# Patient Record
Sex: MALE | Race: WHITE | NOT HISPANIC OR LATINO | Employment: FULL TIME | URBAN - METROPOLITAN AREA
[De-identification: names, ages, dates, MRNs, and addresses within clinical notes are randomized per-mention and may not be internally consistent; named-entity substitution may affect disease eponyms.]

---

## 2023-11-20 LAB
ALT SERPL-CCNC: 34 IU/L (ref 10–50)
AST SERPL-CCNC: 22 IU/L (ref 10–50)
CREATININE (EXTERNAL): 1.13 MG/DL (ref 0.7–1.2)
GFR ESTIMATED (EXTERNAL): 73 ML/MIN/1.73M2
GLUCOSE (EXTERNAL): 123 MG/DL (ref 70–99)
POTASSIUM (EXTERNAL): 3.5 MMOL/L (ref 3.5–5.1)

## 2023-11-22 ENCOUNTER — HOSPITAL ENCOUNTER (INPATIENT)
Facility: CLINIC | Age: 63
LOS: 1 days | Discharge: HOME | End: 2023-11-23
Attending: EMERGENCY MEDICINE | Admitting: HOSPITALIST
Payer: COMMERCIAL

## 2023-11-22 ENCOUNTER — APPOINTMENT (OUTPATIENT)
Dept: ULTRASOUND IMAGING | Facility: CLINIC | Age: 63
End: 2023-11-22
Attending: EMERGENCY MEDICINE
Payer: COMMERCIAL

## 2023-11-22 ENCOUNTER — TRANSFERRED RECORDS (OUTPATIENT)
Dept: HEALTH INFORMATION MANAGEMENT | Facility: CLINIC | Age: 63
End: 2023-11-22

## 2023-11-22 DIAGNOSIS — D69.3 IDIOPATHIC THROMBOCYTOPENIC PURPURA (H): Primary | ICD-10-CM

## 2023-11-22 DIAGNOSIS — D69.6 THROMBOCYTOPENIA (H): ICD-10-CM

## 2023-11-22 PROBLEM — K76.0 FATTY LIVER: Status: ACTIVE | Noted: 2023-11-22

## 2023-11-22 LAB
ABO/RH(D): NORMAL
ALBUMIN SERPL BCG-MCNC: 4.1 G/DL (ref 3.5–5.2)
ALP SERPL-CCNC: 49 U/L (ref 40–150)
ALT SERPL W P-5'-P-CCNC: 30 U/L (ref 0–70)
ANION GAP SERPL CALCULATED.3IONS-SCNC: 10 MMOL/L (ref 7–15)
ANTIBODY SCREEN: NEGATIVE
APTT PPP: 28 SECONDS (ref 22–38)
AST SERPL W P-5'-P-CCNC: 20 U/L (ref 0–45)
BASOPHILS # BLD AUTO: 0 10E3/UL (ref 0–0.2)
BASOPHILS # BLD AUTO: 0 10E3/UL (ref 0–0.2)
BASOPHILS NFR BLD AUTO: 0 %
BASOPHILS NFR BLD AUTO: 0 %
BILIRUB SERPL-MCNC: 0.4 MG/DL
BLD PROD TYP BPU: NORMAL
BLOOD COMPONENT TYPE: NORMAL
BUN SERPL-MCNC: 23.1 MG/DL (ref 8–23)
CALCIUM SERPL-MCNC: 9 MG/DL (ref 8.8–10.2)
CHLORIDE SERPL-SCNC: 99 MMOL/L (ref 98–107)
CODING SYSTEM: NORMAL
CREAT SERPL-MCNC: 1.15 MG/DL (ref 0.67–1.17)
DEPRECATED HCO3 PLAS-SCNC: 29 MMOL/L (ref 22–29)
EGFRCR SERPLBLD CKD-EPI 2021: 72 ML/MIN/1.73M2
EOSINOPHIL # BLD AUTO: 0 10E3/UL (ref 0–0.7)
EOSINOPHIL # BLD AUTO: 0 10E3/UL (ref 0–0.7)
EOSINOPHIL NFR BLD AUTO: 0 %
EOSINOPHIL NFR BLD AUTO: 0 %
ERYTHROCYTE [DISTWIDTH] IN BLOOD BY AUTOMATED COUNT: 12.6 % (ref 10–15)
ERYTHROCYTE [DISTWIDTH] IN BLOOD BY AUTOMATED COUNT: 12.7 % (ref 10–15)
GLUCOSE SERPL-MCNC: 147 MG/DL (ref 70–99)
HCT VFR BLD AUTO: 40.8 % (ref 40–53)
HCT VFR BLD AUTO: 46.5 % (ref 40–53)
HGB BLD-MCNC: 13.9 G/DL (ref 13.3–17.7)
HGB BLD-MCNC: 15.5 G/DL (ref 13.3–17.7)
IMM GRANULOCYTES # BLD: 0 10E3/UL
IMM GRANULOCYTES # BLD: 0.1 10E3/UL
IMM GRANULOCYTES NFR BLD: 1 %
IMM GRANULOCYTES NFR BLD: 1 %
INR PPP: 0.91 (ref 0.85–1.15)
ISSUE DATE AND TIME: NORMAL
LYMPHOCYTES # BLD AUTO: 0.2 10E3/UL (ref 0.8–5.3)
LYMPHOCYTES # BLD AUTO: 0.4 10E3/UL (ref 0.8–5.3)
LYMPHOCYTES NFR BLD AUTO: 4 %
LYMPHOCYTES NFR BLD AUTO: 6 %
MCH RBC QN AUTO: 30 PG (ref 26.5–33)
MCH RBC QN AUTO: 30 PG (ref 26.5–33)
MCHC RBC AUTO-ENTMCNC: 33.3 G/DL (ref 31.5–36.5)
MCHC RBC AUTO-ENTMCNC: 34.1 G/DL (ref 31.5–36.5)
MCV RBC AUTO: 88 FL (ref 78–100)
MCV RBC AUTO: 90 FL (ref 78–100)
MONOCYTES # BLD AUTO: 0.1 10E3/UL (ref 0–1.3)
MONOCYTES # BLD AUTO: 0.1 10E3/UL (ref 0–1.3)
MONOCYTES NFR BLD AUTO: 1 %
MONOCYTES NFR BLD AUTO: 2 %
NEUTROPHILS # BLD AUTO: 5.7 10E3/UL (ref 1.6–8.3)
NEUTROPHILS # BLD AUTO: 6.1 10E3/UL (ref 1.6–8.3)
NEUTROPHILS NFR BLD AUTO: 91 %
NEUTROPHILS NFR BLD AUTO: 94 %
NRBC # BLD AUTO: 0 10E3/UL
NRBC # BLD AUTO: 0 10E3/UL
NRBC BLD AUTO-RTO: 0 /100
NRBC BLD AUTO-RTO: 0 /100
PLATELET # BLD AUTO: 39 10E3/UL (ref 150–450)
PLATELET # BLD AUTO: 4 10E3/UL (ref 150–450)
POTASSIUM SERPL-SCNC: 3.8 MMOL/L (ref 3.4–5.3)
PROT SERPL-MCNC: 7.7 G/DL (ref 6.4–8.3)
RBC # BLD AUTO: 4.64 10E6/UL (ref 4.4–5.9)
RBC # BLD AUTO: 5.17 10E6/UL (ref 4.4–5.9)
RETICS # AUTO: 0.05 10E6/UL (ref 0.03–0.1)
RETICS/RBC NFR AUTO: 1.1 % (ref 0.5–2)
SODIUM SERPL-SCNC: 138 MMOL/L (ref 135–145)
SPECIMEN EXPIRATION DATE: NORMAL
UNIT ABO/RH: NORMAL
UNIT NUMBER: NORMAL
UNIT STATUS: NORMAL
UNIT TYPE ISBT: 9500
WBC # BLD AUTO: 6.3 10E3/UL (ref 4–11)
WBC # BLD AUTO: 6.4 10E3/UL (ref 4–11)

## 2023-11-22 PROCEDURE — P9035 PLATELET PHERES LEUKOREDUCED: HCPCS | Performed by: EMERGENCY MEDICINE

## 2023-11-22 PROCEDURE — 86901 BLOOD TYPING SEROLOGIC RH(D): CPT | Performed by: EMERGENCY MEDICINE

## 2023-11-22 PROCEDURE — 76705 ECHO EXAM OF ABDOMEN: CPT

## 2023-11-22 PROCEDURE — 3E0330M INTRODUCTION OF MONOCLONAL ANTIBODY INTO PERIPHERAL VEIN, PERCUTANEOUS APPROACH: ICD-10-PCS | Performed by: STUDENT IN AN ORGANIZED HEALTH CARE EDUCATION/TRAINING PROGRAM

## 2023-11-22 PROCEDURE — 80053 COMPREHEN METABOLIC PANEL: CPT | Performed by: EMERGENCY MEDICINE

## 2023-11-22 PROCEDURE — 36415 COLL VENOUS BLD VENIPUNCTURE: CPT | Performed by: INTERNAL MEDICINE

## 2023-11-22 PROCEDURE — 96374 THER/PROPH/DIAG INJ IV PUSH: CPT

## 2023-11-22 PROCEDURE — 250N000013 HC RX MED GY IP 250 OP 250 PS 637: Performed by: EMERGENCY MEDICINE

## 2023-11-22 PROCEDURE — 250N000013 HC RX MED GY IP 250 OP 250 PS 637: Performed by: PHYSICIAN ASSISTANT

## 2023-11-22 PROCEDURE — 85730 THROMBOPLASTIN TIME PARTIAL: CPT | Performed by: EMERGENCY MEDICINE

## 2023-11-22 PROCEDURE — 85025 COMPLETE CBC W/AUTO DIFF WBC: CPT | Performed by: INTERNAL MEDICINE

## 2023-11-22 PROCEDURE — 36415 COLL VENOUS BLD VENIPUNCTURE: CPT | Performed by: EMERGENCY MEDICINE

## 2023-11-22 PROCEDURE — 85610 PROTHROMBIN TIME: CPT | Performed by: EMERGENCY MEDICINE

## 2023-11-22 PROCEDURE — 30243S1 TRANSFUSION OF NONAUTOLOGOUS GLOBULIN INTO CENTRAL VEIN, PERCUTANEOUS APPROACH: ICD-10-PCS | Performed by: EMERGENCY MEDICINE

## 2023-11-22 PROCEDURE — 250N000011 HC RX IP 250 OP 636: Mod: JZ | Performed by: EMERGENCY MEDICINE

## 2023-11-22 PROCEDURE — 258N000003 HC RX IP 258 OP 636: Performed by: EMERGENCY MEDICINE

## 2023-11-22 PROCEDURE — 120N000001 HC R&B MED SURG/OB

## 2023-11-22 PROCEDURE — 99223 1ST HOSP IP/OBS HIGH 75: CPT | Performed by: PHYSICIAN ASSISTANT

## 2023-11-22 PROCEDURE — 99285 EMERGENCY DEPT VISIT HI MDM: CPT | Mod: 25

## 2023-11-22 PROCEDURE — 85060 BLOOD SMEAR INTERPRETATION: CPT | Performed by: PATHOLOGY

## 2023-11-22 PROCEDURE — 85045 AUTOMATED RETICULOCYTE COUNT: CPT | Performed by: INTERNAL MEDICINE

## 2023-11-22 PROCEDURE — 85025 COMPLETE CBC W/AUTO DIFF WBC: CPT | Performed by: EMERGENCY MEDICINE

## 2023-11-22 RX ORDER — AMOXICILLIN 250 MG
1 CAPSULE ORAL 2 TIMES DAILY PRN
Status: DISCONTINUED | OUTPATIENT
Start: 2023-11-22 | End: 2023-11-23 | Stop reason: HOSPADM

## 2023-11-22 RX ORDER — AMLODIPINE BESYLATE 5 MG/1
5 TABLET ORAL 2 TIMES DAILY PRN
Status: DISCONTINUED | OUTPATIENT
Start: 2023-11-23 | End: 2023-11-23 | Stop reason: HOSPADM

## 2023-11-22 RX ORDER — OXYMETAZOLINE HYDROCHLORIDE 0.05 G/100ML
2 SPRAY NASAL 2 TIMES DAILY PRN
Status: DISCONTINUED | OUTPATIENT
Start: 2023-11-22 | End: 2023-11-23 | Stop reason: HOSPADM

## 2023-11-22 RX ORDER — DIPHENHYDRAMINE HCL 25 MG
50 CAPSULE ORAL
Status: DISCONTINUED | OUTPATIENT
Start: 2023-11-22 | End: 2023-11-23 | Stop reason: HOSPADM

## 2023-11-22 RX ORDER — AMOXICILLIN 250 MG
2 CAPSULE ORAL 2 TIMES DAILY PRN
Status: DISCONTINUED | OUTPATIENT
Start: 2023-11-22 | End: 2023-11-23 | Stop reason: HOSPADM

## 2023-11-22 RX ORDER — DIPHENHYDRAMINE HYDROCHLORIDE 50 MG/ML
50 INJECTION INTRAMUSCULAR; INTRAVENOUS ONCE
Status: COMPLETED | OUTPATIENT
Start: 2023-11-22 | End: 2023-11-22

## 2023-11-22 RX ORDER — PANTOPRAZOLE SODIUM 40 MG/1
40 TABLET, DELAYED RELEASE ORAL DAILY
Status: DISCONTINUED | OUTPATIENT
Start: 2023-11-23 | End: 2023-11-23 | Stop reason: HOSPADM

## 2023-11-22 RX ORDER — DIPHENHYDRAMINE HYDROCHLORIDE 50 MG/ML
50 INJECTION INTRAMUSCULAR; INTRAVENOUS
Status: COMPLETED | OUTPATIENT
Start: 2023-11-22 | End: 2023-11-23

## 2023-11-22 RX ORDER — ACETAMINOPHEN 325 MG/1
650 TABLET ORAL
Status: DISCONTINUED | OUTPATIENT
Start: 2023-11-22 | End: 2023-11-23 | Stop reason: HOSPADM

## 2023-11-22 RX ORDER — INDOMETHACIN 50 MG/1
50 CAPSULE ORAL 3 TIMES DAILY PRN
Status: ON HOLD | COMMUNITY
End: 2023-11-23

## 2023-11-22 RX ORDER — CALCIUM CARBONATE 500 MG/1
1000 TABLET, CHEWABLE ORAL 4 TIMES DAILY PRN
Status: DISCONTINUED | OUTPATIENT
Start: 2023-11-22 | End: 2023-11-23 | Stop reason: HOSPADM

## 2023-11-22 RX ORDER — PANTOPRAZOLE SODIUM 20 MG/1
20 TABLET, DELAYED RELEASE ORAL ONCE
Status: DISCONTINUED | OUTPATIENT
Start: 2023-11-22 | End: 2023-11-22

## 2023-11-22 RX ORDER — PREDNISONE 20 MG/1
100 TABLET ORAL DAILY
COMMUNITY

## 2023-11-22 RX ORDER — DIPHENHYDRAMINE HCL 25 MG
50 CAPSULE ORAL ONCE
Status: COMPLETED | OUTPATIENT
Start: 2023-11-22 | End: 2023-11-22

## 2023-11-22 RX ORDER — LIDOCAINE 40 MG/G
CREAM TOPICAL
Status: DISCONTINUED | OUTPATIENT
Start: 2023-11-22 | End: 2023-11-23 | Stop reason: HOSPADM

## 2023-11-22 RX ORDER — ACETAMINOPHEN 650 MG/1
650 SUPPOSITORY RECTAL EVERY 4 HOURS PRN
Status: DISCONTINUED | OUTPATIENT
Start: 2023-11-22 | End: 2023-11-23 | Stop reason: HOSPADM

## 2023-11-22 RX ORDER — ONDANSETRON 2 MG/ML
4 INJECTION INTRAMUSCULAR; INTRAVENOUS EVERY 6 HOURS PRN
Status: DISCONTINUED | OUTPATIENT
Start: 2023-11-22 | End: 2023-11-23 | Stop reason: HOSPADM

## 2023-11-22 RX ORDER — PANTOPRAZOLE SODIUM 40 MG/1
40 TABLET, DELAYED RELEASE ORAL
Status: DISCONTINUED | OUTPATIENT
Start: 2023-11-23 | End: 2023-11-22

## 2023-11-22 RX ORDER — ACETAMINOPHEN 325 MG/1
650 TABLET ORAL ONCE
Status: COMPLETED | OUTPATIENT
Start: 2023-11-22 | End: 2023-11-22

## 2023-11-22 RX ORDER — ONDANSETRON 4 MG/1
4 TABLET, ORALLY DISINTEGRATING ORAL EVERY 6 HOURS PRN
Status: DISCONTINUED | OUTPATIENT
Start: 2023-11-22 | End: 2023-11-23 | Stop reason: HOSPADM

## 2023-11-22 RX ORDER — ACETAMINOPHEN 325 MG/1
650 TABLET ORAL EVERY 4 HOURS PRN
Status: DISCONTINUED | OUTPATIENT
Start: 2023-11-22 | End: 2023-11-23 | Stop reason: HOSPADM

## 2023-11-22 RX ORDER — METHYLPREDNISOLONE SODIUM SUCCINATE 125 MG/2ML
125 INJECTION, POWDER, LYOPHILIZED, FOR SOLUTION INTRAMUSCULAR; INTRAVENOUS
Status: DISCONTINUED | OUTPATIENT
Start: 2023-11-22 | End: 2023-11-23 | Stop reason: HOSPADM

## 2023-11-22 RX ORDER — DIPHENHYDRAMINE HYDROCHLORIDE 50 MG/ML
50 INJECTION INTRAMUSCULAR; INTRAVENOUS
Status: DISCONTINUED | OUTPATIENT
Start: 2023-11-22 | End: 2023-11-23 | Stop reason: HOSPADM

## 2023-11-22 RX ADMIN — Medication 1 SPRAY: at 18:30

## 2023-11-22 RX ADMIN — ACETAMINOPHEN 650 MG: 325 TABLET, FILM COATED ORAL at 14:40

## 2023-11-22 RX ADMIN — Medication 1 SPRAY: at 20:27

## 2023-11-22 RX ADMIN — SODIUM CHLORIDE 1000 MG: 9 INJECTION, SOLUTION INTRAVENOUS at 13:23

## 2023-11-22 RX ADMIN — HUMAN IMMUNOGLOBULIN G 35 G: 20 LIQUID INTRAVENOUS at 15:15

## 2023-11-22 RX ADMIN — DIPHENHYDRAMINE HYDROCHLORIDE 50 MG: 25 CAPSULE ORAL at 14:40

## 2023-11-22 ASSESSMENT — ACTIVITIES OF DAILY LIVING (ADL)
ADLS_ACUITY_SCORE: 18
ADLS_ACUITY_SCORE: 18
ADLS_ACUITY_SCORE: 35
ADLS_ACUITY_SCORE: 18
ADLS_ACUITY_SCORE: 35
ADLS_ACUITY_SCORE: 35

## 2023-11-22 NOTE — H&P
[facility]  History and Physical - Hospitalist Service     Date of Admission:  11/22/2023  PRIMARY CARE PROVIDER: No Ref-Primary, Physician    Assessment & Plan   Link Campbell is a 63 year old male , with a PMH significant for chronic ITP, recently started treatment, fatty liver, gout,, who was sent from oncology office for platelets of 4 and admitted on 11/22/2023.,  Acute on chronic thrombocytopenia with need for additional plts/IVIG/steroids.    Principal Problem:    Idiopathic thrombocytopenic purpura (H)  Active Problems:    Thrombocytopenia (H24)    Fatty liver      Acute on Chroic Thrombocytopenia (H24)  Idiopathic thrombocytopenic purpura   Known ITP since 2015 per chart review.  In the last 3 weeks started IVIG, Solu-Medrol, and rituximab 2 days ago.  11/20 Platelets 15 (per pt) have fallen to 4, as proximal reason for admission from oncology office for platelet transfusion, steroids IVIG, additional work-up.  US abdomen w/o splenomegaly.  Transfuse 1 unit per oncology. Had minimal nosebleed (pinkish), o/w no other acute bleeding sx  -Admit inpatient  -Transfuse 1 unit now, prn plts<25 w bleed, >10 w/o  -[facility] consulted  - s/p IVIG in ED  - s/p Methylpred in ED  - add prn trazodone for sleep.,  - PTA PPI for stomach protection  -Ultrasound abdomen-spleen normal size  -Future bone biopsy per Onc  - afrin nasal prn recurrent mild nose bleed.  - add nasal saline q 2 hrs  - A.m. CBC, BMP  - avoid chemo ppx for DVT    Mild epistaxis, resolved  Pinkish bleed before, Likely secondary to thrombocytopenia.  However if recurs give Afrin as first as needed to abort, direct pressure.  - start nasal saline spray q 2 hrs while awake  - humidifier in room may help  - afrin prn recurrent mild bleed    Elevated BP w/o hx HTN  Likely steroids related  - add prn amlodipine for SBP>160      Fatty liver  Hx Alcohol use  Per patient/wife report about 2 olman per night.  Could contribute  "to low plts and hx fatty liver.  Advised to stop all alcohol , cSonaw past advised  -Alcohol cessation encouraged, he  -Follow-up with PCP    Hx Gout - PTA prn indomethacin held, no current sx    Clinically Significant Risk Factors Present on Admission                # Thrombocytopenia: Lowest platelets = 4 in last 2 days, will monitor for bleeding        # Obesity: Estimated body mass index is 31.93 kg/m  as calculated from the following:    Height as of this encounter: 1.727 m (5' 8\").    Weight as of this encounter: 95.3 kg (210 lb).                 Diet:  reg  DVT Prophylaxis: Low Risk/Ambulatory with no VTE prophylaxis indicated  Hayden Catheter: Not present  Lines: None     Cardiac Monitoring: None  Code Status:  FULL     Disposition: inpt, > 2 midnights expected   Disposition Plan      Expected Discharge Date: 11/24/2023             The patient has been discussed in detail with Dr. Segovia, hospitalist, who agrees with the assessment and plan as noted  The patient's care was discussed with the Attending Physician, Dr. Conner in ED, Patient, Patient's Family, Primary team, and   .  Entered: Prashant Ha PA-C 11/22/2023, 2:34 PM       Prashant Ha PA-C  [facility]  Securely message with the Vocera Web Console (learn more here)  Text page via Hello Agent Paging/Directory      The above form was partially completed using Dragon voice dictation software.  At times inadvertent word substitutions or word omissions may occur, not seen on proof read. Should any part of the documentation be unclear or otherwise contradictory, reader should please contact me for clarification.    ______________________________________________________________________    Chief Complaint     Low plts    History of Present Illness   History is obtained from the ED provider, patient and EMR.  Pt is considered a reliable historian      Patient essentially asx, he reports he has had minimal mild pinkish nosebleed.  " "Nothing javier.  Has not used any meds.  Has no other acute bleeding symptoms. No CP, RAMIREZ, SOB, Abd pain, other sx.     [place] [facility] notes reviewed  Chronic ITP since 2015.    Recent treatments below/summarized  -11/6-1 g Solu-Medrol  -11/8-1 g Solu-Medrol  -11/8 IVIG infusion  -11/9 IVIG  -11/10-started prednisone 100 daily  -11/20-first dose rituximab, pt report plts 15  -11/22-platelets 4, sent to admission.    No hx HTN, past meds  No steroids SE, sleeping ok, no mood sx  No other new sx    Past Medical History    I have reviewed this patient's medical history and updated it with pertinent information if needed.   ITP  No past medical history on file.    Prior to Admission Medications   Prior to Admission Medications   Prescriptions Last Dose Informant Patient Reported? Taking?   indomethacin (INDOCIN) 50 MG capsule  at PRN  Yes Yes   Sig: Take 50 mg by mouth 3 times daily as needed for other (gout)   omeprazole (PRILOSEC) 20 MG DR capsule 11/22/2023  Yes Yes   Sig: Take 20 mg by mouth daily   predniSONE (DELTASONE) 20 MG tablet 11/22/2023  Yes Yes   Sig: Take 100 mg by mouth daily Take with food      Facility-Administered Medications: None     Allergies   Allergies   Allergen Reactions    Penicillins Rash       Physical Exam   Vitals:    11/22/23 1208 11/22/23 1230 11/22/23 1245 11/22/23 1300   BP: (!) 173/87 (!) 156/99 (!) 150/85 (!) 147/90   Pulse: (!) 46  55 66   Resp: 16      Temp: 97  F (36.1  C)      TempSrc: Temporal      SpO2: 99%      Weight: 95.3 kg (210 lb)      Height: 1.727 m (5' 8\")        Vital Signs: Temp: 97  F (36.1  C) Temp src: Temporal BP: (!) 147/90 Pulse: 66   Resp: 16 SpO2: 99 % O2 Device: None (Room air)    Weight: 210 lbs 0 oz    Constitutional: Awake, alert, cooperative, no apparent distress.    ENT: Normocephalic, Normal sclera.  No epistaxis  Neck:no JVD  Pulmonary: on RA< clear to auscultation bilaterally, no crackles or wheezing.  Cardiovascular: Regular rate and rhythm, s1, " s,2 w/o murmur  GI:  nontender.  Skin/Integumen: Visualized skin appeared clear w/o rashes.  Neuro: nonfocal  Psych:  Alert and oriented x 3. Normal affect.  Extremities: No lower extremity edema noted,       Medical Decision Making       Please see A&P for additional details of medical decision making.  MANAGEMENT DISCUSSED with the following over the past 24 hours:     NOTE(S)/MEDICAL RECORDS REVIEWED over the past 24 hours:            Data   Data reviewed today: I reviewed all medications, new labs and imaging results over the last 24 hours.     Labs:   CBC with Diff:  Recent Labs   Lab Test 11/22/23  1234   WBC 6.3   HGB 15.5   MCV 90   PLT 4*   INR 0.91        Comprehensive Metabolic Panel:  Recent Labs   Lab 11/22/23  1234      POTASSIUM 3.8   CHLORIDE 99   CO2 29   ANIONGAP 10   *   BUN 23.1*   CR 1.15   GFRESTIMATED 72   SCOTT 9.0   PROTTOTAL 7.7   ALBUMIN 4.1   BILITOTAL 0.4   ALKPHOS 49   AST 20   ALT 30       IMAGING:   US Abdomen Limited 11/22/2023 2:03 PM   IMPRESSION: 1.  Normal spleen measuring 12.1 cm in length.       ------------------------- PAST 24 HR DATA REVIEWED -----------------------------------------------    I have personally reviewed the following data over the past 24 hrs:    6.3  \   15.5   / 4 (LL)     138 99 23.1 (H) /  147 (H)   3.8 29 1.15 \     ALT: 30 AST: 20 AP: 49 TBILI: 0.4   ALB: 4.1 TOT PROTEIN: 7.7 LIPASE: N/A     INR:  0.91 PTT:  28   D-dimer:  N/A Fibrinogen:  N/A         The above form was partially completed using Dragon voice dictation software.  At times inadvertent word substitutions or word omissions may occur, not seen on proof read. Should any part of the documentation be unclear or otherwise contradictory, reader is encouraged to please contact me for clarification.

## 2023-11-22 NOTE — ED PROVIDER NOTES
History   Chief Complaint:  Abnormal Labs     HPI   Link Campbell is a 63 year old male with past medical history of ITP and fatty liver disease who presents with low platelets.  Patient was seen in oncology clinic earlier today and found to have a platelet value of 4.  He was referred to emergency department for platelet transfusion, initiation of IVIG and high-dose steroids.  The patient was started on rituximab on Monday of this week although platelets have continued to downtrend.  Has had some pinkish discharge from the nose but no javier hemorrhage from the nose or epistaxis.  Of note the patient has been followed closely in outpatient oncology clinic with [place] [facility] in the past.  On November 6, 2023 patient was found to have platelets of 9000 and given 1000 mg of IV Solu-Medrol.  He received an additional 1000 mg IV Solu-Medrol on 8 November.  IVIG was given on the eighth ninth and 10th of this month.  Platelets improved significantly with a high point of November 13 platelets were 113,000.  Unfortunately given the low platelets he was referred back to the emergency department here today.  He denies any chest pain, shortness of breath, abdominal pain, fever or constitutional symptoms      Independent Historian:   None - Patient Only    Review of External Notes:   I reviewed the patient's oncology note from [place] [facility] dated 11/22/2023.  Patient with a history of chronic ITP.  Low platelets today at 3000.  Recommended for IVIG, loading dose of Solu-Medrol and a platelet transfusion.    Medications:    indomethacin (INDOCIN) 50 MG capsule  omeprazole (PRILOSEC) 20 MG DR capsule  predniSONE (DELTASONE) 20 MG tablet      Past Medical History:    No past medical history on file.  Past Surgical History:    No past surgical history on file.   Physical Exam   Patient Vitals for the past 24 hrs:   BP Temp Temp src Pulse Resp SpO2 Height Weight   11/22/23 1300 (!) 147/90 -- -- 66 -- -- -- --  "  11/22/23 1245 (!) 150/85 -- -- 55 -- -- -- --   11/22/23 1230 (!) 156/99 -- -- -- -- -- -- --   11/22/23 1208 (!) 173/87 97  F (36.1  C) Temporal (!) 46 16 99 % 1.727 m (5' 8\") 95.3 kg (210 lb)      General: Alert, appears well-developed and well-nourished. Cooperative.     In no acute distress  HEENT:  Head:  Atraumatic  Ears:  External ears are normal  Mouth/Throat:  Oropharynx is without erythema or exudate and mucous membranes are moist.  Mucous membrane petechiae including to the tongue.  Nose:   No epistaxis.  Eyes:   Conjunctivae normal and EOM are normal. No scleral icterus.  CV:  Normal rate, regular rhythm, normal heart sounds and radial pulses are 2+ and symmetric.  No murmur.  Resp:  Breath sounds are clear bilaterally    Non-labored, no retractions or accessory muscle use  GI:  Abdomen is soft, no distension, no tenderness. No rebound or guarding.  No CVA tenderness bilaterally  MS:  Normal range of motion. No edema.    Normal strength in all 4 extremities.     Back atraumatic.    No midline cervical, thoracic, or lumbar tenderness  Skin:  Warm and dry.  Purpuric rash intra-orally.   Neuro:   Alert. Normal strength.  GCS: 15  Psych: Normal mood and affect.    Emergency Department Course   No results found for this or any previous visit.  Imaging:  US Abdomen Limited    (Results Pending)      Report per radiology    Laboratory:  Labs Ordered and Resulted from Time of ED Arrival to Time of ED Departure   COMPREHENSIVE METABOLIC PANEL - Abnormal       Result Value    Sodium 138      Potassium 3.8      Carbon Dioxide (CO2) 29      Anion Gap 10      Urea Nitrogen 23.1 (*)     Creatinine 1.15      GFR Estimate 72      Calcium 9.0      Chloride 99      Glucose 147 (*)     Alkaline Phosphatase 49      AST 20      ALT 30      Protein Total 7.7      Albumin 4.1      Bilirubin Total 0.4     CBC WITH PLATELETS AND DIFFERENTIAL - Abnormal    WBC Count 6.3      RBC Count 5.17      Hemoglobin 15.5      Hematocrit " 46.5      MCV 90      MCH 30.0      MCHC 33.3      RDW 12.6      Platelet Count 4 (*)     % Neutrophils 91      % Lymphocytes 6      % Monocytes 2      % Eosinophils 0      % Basophils 0      % Immature Granulocytes 1      NRBCs per 100 WBC 0      Absolute Neutrophils 5.7      Absolute Lymphocytes 0.4 (*)     Absolute Monocytes 0.1      Absolute Eosinophils 0.0      Absolute Basophils 0.0      Absolute Immature Granulocytes 0.1      Absolute NRBCs 0.0     INR - Normal    INR 0.91     PARTIAL THROMBOPLASTIN TIME - Normal    aPTT 28     TYPE AND SCREEN, ADULT    ABO/RH(D) O POS      Antibody Screen Negative      SPECIMEN EXPIRATION DATE [id number]     PREPARE PHERESED PLATELETS (UNIT)   TRANSFUSE PHERESED PLATELETS (UNIT)   ABO/RH TYPE AND SCREEN        Procedures     Emergency Department Course & Assessments:       Interventions:  Medications   acetaminophen (TYLENOL) tablet 650 mg (has no administration in time range)   diphenhydrAMINE (BENADRYL) capsule 50 mg (has no administration in time range)     Or   diphenhydrAMINE (BENADRYL) injection 50 mg (has no administration in time range)   diphenhydrAMINE (BENADRYL) capsule 50 mg (has no administration in time range)     Or   diphenhydrAMINE (BENADRYL) injection 50 mg (has no administration in time range)   acetaminophen (TYLENOL) tablet 650 mg (has no administration in time range)   EPINEPHrine (ADRENALIN) kit 0.3 mg (has no administration in time range)   diphenhydrAMINE (BENADRYL) injection 50 mg (has no administration in time range)   methylPREDNISolone sodium succinate (solu-MEDROL) injection 125 mg (has no administration in time range)   famotidine (PEPCID) injection 20 mg (has no administration in time range)   immune globulin - sucrose free 10 % injection 35 g (has no administration in time range)   methylPREDNISolone sodium succinate (solu-MEDROL) 1,000 mg in sodium chloride 0.9 % 283 mL intermittent infusion (1,000 mg Intravenous $New Bag 11/22/23 5783)         Independent Interpretation (X-rays, CTs, rhythm strip):  None    Consultations/Discussion of Management or Tests:  ED Course as of 11/22/23 1407   Wed Nov 22, 2023   1105 I spoke with Dr. Vail of [facility] regarding presentation and need for emergent intervention and hospitalization.    1330 I spoke with Prashant Chanel PA-C on behalf of Dr. Segovia for admission.        Social Determinants of Health affecting care:   None    Disposition:  The patient was admitted to the hospital under the care of Dr. Segovia.     Impression & Plan    CMS Diagnoses: None    Medical Decision Making:  Patient is a 63-year-old male who presents from oncology clinic with low platelets.  Patient with a chronic history of ITP.  Thankfully patient is not having any active evidence of extravasation.  He does have petechiae intraorally as well as purpuric rash to the tongue.  He has been having some intermittent pink discharge from the nose as well but no evidence of active epistaxis.  Platelets confirmed at 4.  In discussing with oncology patient recommended to receive a unit of platelets as well as IVIG and high-dose steroids.  I have ordered the patient a transfusion of platelets as well as IVIG and high-dose Solu-Medrol.  We initiated the high-dose steroids as well as platelets and IVIG here in the emergency department.  Oncology had also recommended ultrasound of the spleen to evaluate for potential spleen abnormalities.  Remainder of lab work appears baseline.  Vital signs stable on arrival.  No infectious symptoms.  Plan for admission for further management of ITP and oncology recommendations.  I spoke with the Prashant Chanel PA-C on behalf of Dr. Segovia who agreed to admission.    Critical Care time:  was 30 minutes for this patient excluding procedures.    Diagnosis:    ICD-10-CM    1. Idiopathic thrombocytopenic purpura (H)  D69.3       2. Thrombocytopenia (H24)  D69.6            Discharge Medications:  New  Prescriptions    No medications on file      11/22/2023   Darrell Conner MD White, [place], MD  11/22/23 5455

## 2023-11-22 NOTE — CONSULTS
[facility] Consultation      Link Campbell MRN# [id number]   YOB: 1960 Age: 63 year old   Date of Admission: 11/22/2023  Requesting physician: Prashant Chanel PA-C  Reason for consult: Acute on chronic ITP      Primary hematologist: Dr Vail          Assessment and Plan:   Immune thrombocytopenia  -diagnosis dates back in 2015   -2/10/2022: [place] [facility] [place]. WBC 6.9, hemoglobin 15.3, platelets 45, no clumping noted.  MCV 19.  Normal differential count.  INR 1.1.  CMP normal.  TSH normal 3.12.  SPEP-no paraprotein detected.  Negative for hepatitis B, hepatitis C, HIV.  LDH normal 208.  TORREY positive 1:160, homogeneous H. pylori in the stool negative.  Peripheral smear-mild thrombocytopenia, otherwise negative.  2/17/2022: US abdomen-no acute findings.  Diffuse fatty infiltration of the liver.  Normal size liver and spleen.  3/23/2023: WBC 7.6, hemoglobin 14.9, platelets 47,000, ANC 4.2  9/25/2023: LDH and CMP normal, WBC 6.0, hemoglobin 14.5, platelets 29,000, ANC 3.1  10/2/2023: WBC 6.1, hemoglobin 14.8, platelets 37,000, ANC 3.2, INR 1.4  10/27/2023: Dexamethasone 40 mg daily for 4 days for persistently low platelet count and 30,000 range.  Platelets 22,000.  11/6/23: F/u in clinic- plts 9 K- given 1000 mg iv solumedrol, started on prednisone 1 mg/kg   11/8/2023: Solu-Medrol 1000 g given IV  11/8, 11/9, 11/10/2023: IVIG given.  11/10/2023: Prednisone 100 mg daily started.  Platelets 67,000  11/13/2023: Prednisone decreased to 90 mg's.  Platelets 113,000  11/15/2023: Prednisone increased to 100 mg's, platelets 85,000  11/20/2023: Started rituximab weekly x4  11/22/2023: seen by Dr Vail in the clinic and noted to have epistaxis in the setting of platelet count of 3000.  Per Dr Vail: Discussed case with ER physician at [facility]-patient will need platelet transfusion, IVIG at least 0.5 g/kg dose today and a loading dose of Solu-Medrol  Prednisone can be tapered off as he is  steroid refractory.  He will also need bone biopsy and ultrasound of the abdomen to look at the spleen.  Last ultrasound from February 2022-spleen was normal.    Plan:    -preceding epistaxis appears to have subsided.  Has oral mucosal/tongue petechiae    -platelet count on 11/22/2023 at 4k with normal Hb and WBC counts     -INR/PTT normal arguing against DIC     -labs not consistent with hemolysis.  Not consistent with TTP    -repeat USG abdomen from 11/22/2023 showed no splenomegaly     -his prior prompt response to IVIG therapy, positive TORREY is supportive of diagnosis of immune thrombocytopenia.     -transfused 1 unit of platelets today    -Solumedrol 1g IV daily x 3 days beginning on 11/22/2023    -IVIG 0.5g/dl given today per Dr Vail.  May repeat dose tomorrow if platelet counts<50k or evidence of ongoing bleeding      -daily CBC check.  Will add peripheral smear     -bowel regimen to prevent constipation     -monitor for clinical bleeding     -platelet transfusions as needed for critical bleeding     -scheduled for week 2 of Rituximab in the clinic on 11/27/2023    History of alcoholism    -reported not having had an alcoholic beverage in over a month     -not felt to be the cause of thrombocytopenia    -no findings to suggest chronic liver disease       Full code     Thank you for the consult.  We will continue to follow him with you.    Kris Mullins MD             Chief Complaint:   Abnormal Labs           History of Present Illness:   Link Campbell is a 63 year old gentleman, under the care of my colleague Dr Vail for management of his chronic immune thrombocytopenia.    He was seen in the clinic as recently as 11/20/2023.  I have copied his office visit note from 11/20/2023 below.    Additionally he was seen by Dr. Vail on 11/22/2023 and was noted to have epistaxis in the setting of a platelet count dropping to 3000.  WBC and hemoglobin levels were normal.  He received first dose of rituximab  treatment on 11/20/2023.  Dr. Vail sent him to the hospital for platelet transfusion, high-dose Solu-Medrol and IVIG treatment. She had recommended that he obtain an ultrasound of the abdomen    HEMATOLOGY/MEDICAL ONCOLOGY FOLLOW UP VISIT      Reason for Visit  Follow up thrombocytopenia, anticipating treatment    Assessment  Chronic ITP, at least since 2015.  Diffuse fatty liver  Regular alcohol use  Elevated blood sugar  Plan  Discussed results of labs with patient and wife.  Platelets have dropped.  We will start rituximab weekly for 4 weeks.  Side effects were discussed including, but not limited to infusion reactions.  We will also continue on prednisone 100 mg's daily and hopefully will start to wean in the near future.    Continue omeprazole daily   Recheck CBC on Wednesday   Limit the use of NSAIDs.  Continue to hold off on alcohol  Last ultrasound of the abdomen was in February 2022.  We will obtain again to see if he has any splenomegaly  We will obtain stool H. pylori to rule that out.  Blood sugars have been high secondary to steroids.  Continue to monitor.  The patient and wife verbalized understanding of plan of care, and will be seen sooner if needed.          Pain Scale on Today's Visit  0    Smoking Status  Smoking Tobacco : Never smoker; Smokeless Tobacco : Never used smokeless tobacco; Vaping : Never vaped    Depression Screening Tool Status  Was screened; Outcome positive: No; Screening Date: 09/25/2023; Screening Tool: PRIME MD-PHQ2; Total depression score: 0  ____________________________________________________________________________________________    History of Present Illness  Mr. Campbell is a 63-year-old gentleman referred for evaluation of low platelet count.  History is as follows:  2015: Per patient, platelets around 99,000   6/2019: Platelets 83,000, hemoglobin 14.8  ?2020: Platelets 35,000. Patient was not made aware  2021: No labs done  2/1/2022: Platelets 40,000 done as a part  of preop prior to bunion surgery.  WBC 7.9, hemoglobin 15.1, MCV 90, mild lymphocytosis.  2/10/2022: Presented  to [facility], Dr. Vail. WBC 6.9, hemoglobin 15.3, platelets 45, no clumping noted.  MCV 19.  Normal differential count.  INR 1.1.  CMP normal.  TSH normal 3.12.  SPEP-no paraprotein detected.  Negative for hepatitis B, hepatitis C, HIV.  LDH normal 208.  TORREY positive 1:160, homogeneous H. pylori in the stool negative.  Peripheral smear-mild thrombocytopenia, otherwise negative.  2/17/2022: US abdomen-no acute findings.  Diffuse fatty infiltration of the liver.  Normal size liver and spleen.  3/23/2023: WBC 7.6, hemoglobin 14.9, platelets 47,000, ANC 4.2  9/25/2023: LDH and CMP normal, WBC 6.0, hemoglobin 14.5, platelets 29,000, ANC 3.1  10/2/2023: WBC 6.1, hemoglobin 14.8, platelets 37,000, ANC 3.2, INR 1.4  10/27/2023: Dexamethasone 40 mg daily for 4 days for persistently low platelet count and 30,000 range.  Platelets 22,000.  11/6/23: F/u in clinic- plts 9 K- given 1000 mg iv solumedrol, started on prednisone 1 mg/kg   11/8/2023: Solu-Medrol 1000 g given IV  11/8, 11/9, 11/10/2023: IVIG given.  11/10/2023: Prednisone 100 mg's daily started.  Platelets 67,000  11/13/2023: Prednisone decreased to 90 mg's.  Platelets 113,000  11/15/2023: Prednisone increased to 100 mg's, platelets 85,000  11/20/2023: Started rituximab weekly x4  Subjective  Link is doing well.  He has no bleeding or bruising.  No petechiae.  He has stopped drinking alcohol.  Has never had problems with reflux.  Denies any nausea, vomiting, diarrhea, constipation, fevers, chills, difficulties with urination, edema, or new lumps or bumps.  Ready to move forward with rituximab today.  They have just a few questions for me today.    Review of Systems  Remaining 14 point comprehensive review of systems within normal limits. NCCN Distress Thermometer and Problem List were collected and documented in the patient chart. Remarkable symptoms and  concerns were discussed with the patient. Any additional follow-up is indicated in the plan.          Current Medications  Medication List  Name Date  Indomethacin Oral 02/10/2022  Acetaminophen Oral 09/25/2023  Allopurinol Oral 09/25/2023  Omeprazole Oral Delayed Release Capsule 11/06/2023  Prednisone Oral 11/06/2023    Allergies  Penicillins                Vital Signs  Blood pressure: 167/90, L arm, Regular, Pulse: 43, Temperature: 96.4 F, Respirations: 16, O2 sat: 98%, At Rest, Room Air, Pain Scale: 0, Height: 71 in, Weight: 210.4 lb, BSA: 2.15, BMI: 29.34 kg/m2    Performance Status ECOG or Karnofsky  Karnofsky: 100% Normal, no complaints, no evidence of disease. (Date: 11/20/2023)    Physical Exam  GENERAL: Patient is alert and oriented,  in no acute distress  VITALS: Please see above. These were reviewed.    HEENT: Normocephalic, atraumatic. Sclera are white.    RESPIRATORY: No shortness of breath with conversation.  DERM: No petechiae, rashes, or bruising.  NEURO: The patient is alert and oriented.  Gait is stable.          Lab Results  Lab Results 11/20/2023 11/17/2023 11/15/2023 11/13/2023 11/10/2023 11/09/2023    CBC                 WBC x 10^3/uL 9.5 (H) 11.0 (H) 8.1 9.1 (H) 12.8 (H) 18.1 (H)      RBC x 10^6/uL 4.67 4.93 4.70 4.67 4.49 4.52      NRBC % /100 wbc 0.0 0.0 0.0 0.0 0.0 0.0      HGB g/dL 14.1 14.9 14.3 14.2 13.5 13.7      HCT % 42.3 44.4 42.1 41.7 40.2 40.3      MCV fL 90.6 90.1 89.6 89.3 89.5 89.2      MCH pg 30.2 30.2 30.4 30.4 30.1 30.3      MCHC g/dL 33.3 33.6 34.0 34.1 33.6 34.0      RDW % 13.10 12.70 12.50 12.50 12.60 12.30      PLT x 10^3/uL 15 Critical hematology result obtained (LL) 51 (L) 85 (L) 113 67 (L) 53 (L)      MPV fL 12.8 11.9 10.8 10.7 12.9 ----      Platelet, immature, fraction % 14.4 (H)   6.5 7.4   30.4 (H)      Chandan % 82.3 (H) 85.6 (H) 82.1 (H) 81.1 (H) 85.8 (H) 89.9 (H)      LY % 13.1 (L) 10.8 (L) 13.8 (L) 13.8 (L) 8.5 (L) 5.3 (L)      MO % 3.6 (L) 2.6 (L) 2.9 (L) 3.3  "(L) 4.7 (L) 3.9 (L)      EO % 0.4 0.2 0.2 0.5 0.2 0.0      IG % 0.5 0.7 (H) 0.9 (H) 1.2 (H) 0.7 (H) 0.8 (H)      Chandan # (ANC) x 10^3/uL 7.8 (H) 9.4 (H) 6.6 7.4 (H) 11.0 (H) 16.3 (H)      BA % 0.1 0.1 0.1 0.1 0.1 0.1      MO # x 10^3/uL 0.3 0.3 0.2 0.3 0.6 0.7      EO # x 10^3/uL 0.0 0.0 0.0 0.1 0.0 0.0      BA # x 10^3/uL 0.0 0.0 0.0 0.0 0.0 0.0      IG # x 10^3/uL 0.05 (H) 0.08 (H) 0.07 (H) 0.11 (H) 0.09 (H) 0.15 (H)      LY # x 10^3/uL 1.2 1.2 1.1 1.3 1.1 1.0      CBC Smear review comments           Reactive Lymphs present Large platelets present (A)      Auto CBC comments           Slide review to follow    Coags               Chemistries                 Glucose mg/dL   132 (H) 127 (H) 125 (H) 140 (H) 188 (H)    Anemia Labs               Hormones               Pathology/Cytology               Electrophoresis, Protein               Lab - Other                     MEHDI Rowe, RN, APRN, AGPCNP-BC, AOCNP  Phone: [phone]  Fax: [phone]    CC: CLOTILDE Garland MD (Referring)        Electronically signed by Roxanna Maher CNP 2023 11:51 CST           Physical Exam:   Vitals were reviewed  Blood pressure 126/77, pulse 65, temperature 97  F (36.1  C), temperature source Temporal, resp. rate 16, height 1.727 m (5' 8\"), weight 95.3 kg (210 lb), SpO2 98%.  Temperatures:  Current - Temp: 97  F (36.1  C); Max - Temp  Av  F (36.1  C)  Min: 97  F (36.1  C)  Max: 97  F (36.1  C)  Respiration range: Resp  Av  Min: 16  Max: 16  Pulse range: Pulse  Av  Min: 46  Max: 66  Blood pressure range: Systolic (24hrs), Av , Min:126 , Max:173   ; Diastolic (24hrs), Av, Min:77, Max:99    Pulse oximetry range: SpO2  Av.5 %  Min: 98 %  Max: 99 %  No intake or output data in the 24 hours ending 23 9059    GENERAL: No acute distress.  SKIN: No rashes or jaundice.  HEENT: No conjunctival pallor or scleral icterus.  Buccal and tongue petechaie.  Left nasal cavity superficial erosion with no " active bleeding.    LYMPH: No palpable lymphadenopathy in the cervical, supraclavicular, axillary, or inguinal regions.  HEART: Regular rate and rhythm with no murmurs.  LUNGS: Clear bilaterally.  ABDOMEN: Soft, nontender, nondistended with no palpable hepatosplenomegaly.  EXTREMITIES: No leg edema.  MUSCULOSKELETAL: No pain to percussion over entire spine.  MENTAL: Alert and oriented to person, place, and time.  NEURO: no focal motor or sensory deficits.              Past Medical History:   I have reviewed this patient's past medical history  No past medical history on file.          Past Surgical History:   I have reviewed this patient's past surgical history  No past surgical history on file.            Social History:   I have reviewed this patient's social history  Social History     Tobacco Use    Smoking status: Not on file    Smokeless tobacco: Not on file   Substance Use Topics    Alcohol use: Not on file             Family History:   I have reviewed this patient's family history  No family history on file.          Allergies:     Allergies   Allergen Reactions    Penicillins Rash             Medications:   I have reviewed this patient's current medications  (Not in a hospital admission)    Current Facility-Administered Medications Ordered in Epic   Medication Dose Route Frequency Last Rate Last Admin    acetaminophen (TYLENOL) tablet 650 mg  650 mg Oral Q24H PRN        diphenhydrAMINE (BENADRYL) capsule 50 mg  50 mg Oral Q24H PRN        Or    diphenhydrAMINE (BENADRYL) injection 50 mg  50 mg Intravenous Q24H PRN        diphenhydrAMINE (BENADRYL) injection 50 mg  50 mg Intravenous Once PRN        EPINEPHrine (ADRENALIN) kit 0.3 mg  0.3 mg Intramuscular Q3 Min PRN        famotidine (PEPCID) injection 20 mg  20 mg Intravenous Once PRN        methylPREDNISolone sodium succinate (solu-MEDROL) injection 125 mg  125 mg Intravenous Once PRN         Current Outpatient Medications Ordered in Epic   Medication     indomethacin (INDOCIN) 50 MG capsule    omeprazole (PRILOSEC) 20 MG DR capsule    predniSONE (DELTASONE) 20 MG tablet             Review of Systems:     The 10 point Review of Systems is negative other than noted in the HPI.            Data:   All laboratory data reviewed  Results for orders placed or performed during the hospital encounter of 11/22/23 (from the past 24 hour(s))   Prepare pheresed platelets (unit)   Result Value Ref Range    Blood Component Type Platelets     Product Code D1964C30     Unit Status Transfused     Unit Number [id number]     CODING SYSTEM [id number]     ISSUE DATE AND TIME [id number]     UNIT ABO/RH O-     UNIT TYPE ISBT 9500    CBC with platelets differential    Narrative    The following orders were created for panel order CBC with platelets differential.  Procedure                               Abnormality         Status                     ---------                               -----------         ------                     CBC with platelets and d...[[id number]]  Abnormal            Final result                 Please view results for these tests on the individual orders.   INR   Result Value Ref Range    INR 0.91 0.85 - 1.15   Partial thromboplastin time   Result Value Ref Range    aPTT 28 22 - 38 Seconds   Comprehensive metabolic panel   Result Value Ref Range    Sodium 138 135 - 145 mmol/L    Potassium 3.8 3.4 - 5.3 mmol/L    Carbon Dioxide (CO2) 29 22 - 29 mmol/L    Anion Gap 10 7 - 15 mmol/L    Urea Nitrogen 23.1 (H) 8.0 - 23.0 mg/dL    Creatinine 1.15 0.67 - 1.17 mg/dL    GFR Estimate 72 >60 mL/min/1.73m2    Calcium 9.0 8.8 - 10.2 mg/dL    Chloride 99 98 - 107 mmol/L    Glucose 147 (H) 70 - 99 mg/dL    Alkaline Phosphatase 49 40 - 150 U/L    AST 20 0 - 45 U/L    ALT 30 0 - 70 U/L    Protein Total 7.7 6.4 - 8.3 g/dL    Albumin 4.1 3.5 - 5.2 g/dL    Bilirubin Total 0.4 <=1.2 mg/dL   ABO/Rh type and screen    Narrative    The following orders were created for panel order  ABO/Rh type and screen.  Procedure                               Abnormality         Status                     ---------                               -----------         ------                     Adult Type and Screen[[id number]]                            Final result                 Please view results for these tests on the individual orders.   CBC with platelets and differential   Result Value Ref Range    WBC Count 6.3 4.0 - 11.0 10e3/uL    RBC Count 5.17 4.40 - 5.90 10e6/uL    Hemoglobin 15.5 13.3 - 17.7 g/dL    Hematocrit 46.5 40.0 - 53.0 %    MCV 90 78 - 100 fL    MCH 30.0 26.5 - 33.0 pg    MCHC 33.3 31.5 - 36.5 g/dL    RDW 12.6 10.0 - 15.0 %    Platelet Count 4 (LL) 150 - 450 10e3/uL    % Neutrophils 91 %    % Lymphocytes 6 %    % Monocytes 2 %    % Eosinophils 0 %    % Basophils 0 %    % Immature Granulocytes 1 %    NRBCs per 100 WBC 0 <1 /100    Absolute Neutrophils 5.7 1.6 - 8.3 10e3/uL    Absolute Lymphocytes 0.4 (L) 0.8 - 5.3 10e3/uL    Absolute Monocytes 0.1 0.0 - 1.3 10e3/uL    Absolute Eosinophils 0.0 0.0 - 0.7 10e3/uL    Absolute Basophils 0.0 0.0 - 0.2 10e3/uL    Absolute Immature Granulocytes 0.1 <=0.4 10e3/uL    Absolute NRBCs 0.0 10e3/uL   Adult Type and Screen   Result Value Ref Range    ABO/RH(D) O POS     Antibody Screen Negative Negative    SPECIMEN EXPIRATION DATE [id number]    US Abdomen Limited    Narrative    US ABDOMEN LIMITED 11/22/2023 2:03 PM    CLINICAL HISTORY: Eval for splenomegaly per oncology request for  thrombocytopenia  TECHNIQUE: Limited abdominal ultrasound.    COMPARISON: None.    FINDINGS: The spleen measures 12.1 cm in length. Normal appearance of  the spleen.      Impression    IMPRESSION:  1.  Normal spleen measuring 12.1 cm in length.    YADI HILL MD         SYSTEM ID:  [id number]

## 2023-11-22 NOTE — ED NOTES
[facility]  ED Nurse Handoff Report    ED Chief complaint: Abnormal Labs      ED Diagnosis:   Final diagnoses:   Thrombocytopenia (H24)   Idiopathic thrombocytopenic purpura (H)       Code Status: Full Code    Allergies:   Allergies   Allergen Reactions    Penicillins Rash       Patient Story: Link Campbell is a 63 year old male with past medical history of ITP and fatty liver disease who presents with low platelets.  Patient was seen in oncology clinic earlier today and found to have a platelet value of 4.  He was referred to emergency department for platelet transfusion, initiation of IVIG and high-dose steroids.  The patient was started on rituximab on Monday of this week although platelets have continued to downtrend.  Has had some pinkish discharge from the nose but no javier hemorrhage from the nose or epistaxis.  Of note the patient has been followed closely in outpatient oncology clinic with [facility] in the past.  On November 6, 2023 patient was found to have platelets of 9000 and given 1000 mg of IV Solu-Medrol.  He received an additional 1000 mg IV Solu-Medrol on 8 November.  IVIG was given on the eighth ninth and 10th of this month.  Platelets improved significantly with a high point of November 13 platelets were 113,000.  Unfortunately given the low platelets he was referred back to the emergency department here today.  He denies any chest pain, shortness of breath, abdominal pain, fever or constitutional symptoms   Focused Assessment:  Cardiac-WDL  Resp-WDL  Neuro-WDL    Treatments and/or interventions provided: Oral tyl, oral benadryl, IV solu-medrol, IVIG, 1 unit platelets   Patient's response to treatments and/or interventions: no change    To be done/followed up on inpatient unit:  monitor platelet level    Does this patient have any cognitive concerns?:  A/OX4    Activity level - Baseline/Home:  Independent  Activity Level - Current:   Independent    Patient's  Preferred language: English   Needed?: No    Isolation: None  Infection: Not Applicable  Patient tested for COVID 19 prior to admission: NO  Bariatric?: No    Vital Signs:   Vitals:    11/22/23 1230 11/22/23 1245 11/22/23 1300 11/22/23 1440   BP: (!) 156/99 (!) 150/85 (!) 147/90 126/77   Pulse:  55 66 65   Resp:       Temp:       TempSrc:       SpO2:    98%   Weight:       Height:           Cardiac Rhythm:     Was the PSS-3 completed:   Yes  What interventions are required if any?               Family Comments: none  OBS brochure/video discussed/provided to patient/family: N/A              Name of person given brochure if not patient: NA              Relationship to patient: NA    For the majority of the shift this patient's behavior was Green.   Behavioral interventions performed were NA.    ED NURSE PHONE NUMBER: [phone]

## 2023-11-22 NOTE — PROGRESS NOTES
RECEIVING UNIT ED HANDOFF REVIEW    ED Nurse Handoff Report was reviewed by: Zahraa Smith RN on November 22, 2023 at 2:52 PM

## 2023-11-22 NOTE — ED TRIAGE NOTES
Pt into his oncology clinic; states that platelets are low.     Triage Assessment (Adult)       Row Name 11/22/23 6090          Triage Assessment    Airway WDL WDL        Respiratory WDL    Respiratory WDL WDL        Skin Circulation/Temperature WDL    Skin Circulation/Temperature WDL WDL        Cardiac WDL    Cardiac WDL WDL        Cognitive/Neuro/Behavioral WDL    Cognitive/Neuro/Behavioral WDL WDL

## 2023-11-22 NOTE — PHARMACY-ADMISSION MEDICATION HISTORY
Pharmacist Admission Medication History    Admission medication history is complete. The information provided in this note is only as accurate as the sources available at the time of the update.    Information Source(s): Patient, Family member, [facility], [facility] via in-person and phone    Pertinent Information: please see ED Provider Note from 11/22 for further information about parenteral treatments patient has received recently. Patient did not report any of these to me and I was unaware of these prior to interview. No records have come through yet through [facility] (just [employer]s)    Changes made to PTA medication list:  Added: all meds  Deleted: None  Changed: None    Medication Affordability:  Not including over the counter (OTC) medications, was there a time in the past 3 months when you did not take your medications as prescribed because of cost?: No    Allergies reviewed with patient and updates made in EHR: no, done by RN    Medication History Completed By: Iman Khalil RPH 11/22/2023 1:46 PM    PTA Med List   Medication Sig Last Dose    indomethacin (INDOCIN) 50 MG capsule Take 50 mg by mouth 3 times daily as needed for other (gout)  at PRN    omeprazole (PRILOSEC) 20 MG DR capsule Take 20 mg by mouth daily 11/22/2023    predniSONE (DELTASONE) 20 MG tablet Take 100 mg by mouth daily Take with food 11/22/2023

## 2023-11-22 NOTE — PROGRESS NOTES
SPIRITUAL HEALTH SERVICES Progress Note  [facility] - ED    Referral: Visit for emotional support.    I introduced myself and SH Services. Link shared his wife had just left, and he's doing well, with no SH needs at this time.    SH remains available.    Rev Rhonda Thornton  Associate gabriele Carroll Spiritual [facility] Phone Line [phone]  Spiritual Health Pager [phone]  [facility] (Tuesdays & Thursdays) [phone]

## 2023-11-23 VITALS
HEIGHT: 68 IN | SYSTOLIC BLOOD PRESSURE: 151 MMHG | OXYGEN SATURATION: 98 % | HEART RATE: 58 BPM | RESPIRATION RATE: 18 BRPM | TEMPERATURE: 98.1 F | WEIGHT: 210 LBS | BODY MASS INDEX: 31.83 KG/M2 | DIASTOLIC BLOOD PRESSURE: 82 MMHG

## 2023-11-23 LAB
BASOPHILS # BLD AUTO: 0 10E3/UL (ref 0–0.2)
BASOPHILS NFR BLD AUTO: 0 %
EOSINOPHIL # BLD AUTO: 0 10E3/UL (ref 0–0.7)
EOSINOPHIL NFR BLD AUTO: 0 %
ERYTHROCYTE [DISTWIDTH] IN BLOOD BY AUTOMATED COUNT: 12.4 % (ref 10–15)
HCT VFR BLD AUTO: 40.8 % (ref 40–53)
HGB BLD-MCNC: 13.7 G/DL (ref 13.3–17.7)
IMM GRANULOCYTES # BLD: 0.1 10E3/UL
IMM GRANULOCYTES NFR BLD: 1 %
LYMPHOCYTES # BLD AUTO: 0.4 10E3/UL (ref 0.8–5.3)
LYMPHOCYTES NFR BLD AUTO: 5 %
MCH RBC QN AUTO: 29.8 PG (ref 26.5–33)
MCHC RBC AUTO-ENTMCNC: 33.6 G/DL (ref 31.5–36.5)
MCV RBC AUTO: 89 FL (ref 78–100)
MONOCYTES # BLD AUTO: 0.4 10E3/UL (ref 0–1.3)
MONOCYTES NFR BLD AUTO: 4 %
NEUTROPHILS # BLD AUTO: 8.5 10E3/UL (ref 1.6–8.3)
NEUTROPHILS NFR BLD AUTO: 90 %
NRBC # BLD AUTO: 0 10E3/UL
NRBC BLD AUTO-RTO: 0 /100
PLATELET # BLD AUTO: 40 10E3/UL (ref 150–450)
RBC # BLD AUTO: 4.6 10E6/UL (ref 4.4–5.9)
WBC # BLD AUTO: 9.4 10E3/UL (ref 4–11)

## 2023-11-23 PROCEDURE — 250N000011 HC RX IP 250 OP 636: Mod: JZ | Performed by: INTERNAL MEDICINE

## 2023-11-23 PROCEDURE — 85025 COMPLETE CBC W/AUTO DIFF WBC: CPT | Performed by: INTERNAL MEDICINE

## 2023-11-23 PROCEDURE — 99239 HOSP IP/OBS DSCHRG MGMT >30: CPT | Performed by: STUDENT IN AN ORGANIZED HEALTH CARE EDUCATION/TRAINING PROGRAM

## 2023-11-23 PROCEDURE — 258N000003 HC RX IP 258 OP 636: Performed by: INTERNAL MEDICINE

## 2023-11-23 PROCEDURE — 250N000013 HC RX MED GY IP 250 OP 250 PS 637: Performed by: PHYSICIAN ASSISTANT

## 2023-11-23 PROCEDURE — 250N000011 HC RX IP 250 OP 636: Mod: JZ | Performed by: PHYSICIAN ASSISTANT

## 2023-11-23 PROCEDURE — 36415 COLL VENOUS BLD VENIPUNCTURE: CPT | Performed by: INTERNAL MEDICINE

## 2023-11-23 RX ADMIN — HUMAN IMMUNOGLOBULIN G 10 G: 10 LIQUID INTRAVENOUS at 12:38

## 2023-11-23 RX ADMIN — ACETAMINOPHEN 650 MG: 325 TABLET, FILM COATED ORAL at 11:44

## 2023-11-23 RX ADMIN — DIPHENHYDRAMINE HYDROCHLORIDE 50 MG: 50 INJECTION, SOLUTION INTRAMUSCULAR; INTRAVENOUS at 11:49

## 2023-11-23 RX ADMIN — SODIUM CHLORIDE 1000 MG: 9 INJECTION, SOLUTION INTRAVENOUS at 11:52

## 2023-11-23 RX ADMIN — PANTOPRAZOLE SODIUM 40 MG: 40 TABLET, DELAYED RELEASE ORAL at 08:11

## 2023-11-23 ASSESSMENT — ACTIVITIES OF DAILY LIVING (ADL)
ADLS_ACUITY_SCORE: 18

## 2023-11-23 NOTE — PROGRESS NOTES
Discharge    Patient discharged to home via ambulation with wife.  Care plan note: alert and oriented x4. VSS on RA, except bradycardic in 50s per baseline and hypertensive 140s/150s at times. Independent in room and ambulated in halls multiple times this shift. Tolerating regular diet. Denies pain. Admitted with platelets 3,000 and improved to 40,000. IV immune globulin and Solumedrol given with no reactions. IV's removed. Discharge medications and paperwork given with patient able to read back with clear understanding.    Listed belongings gathered and given to patient (including from security/pharmacy). Yes  Care Plan and Patient education resolved: Yes  Prescriptions if needed, hard copies sent with patient  Yes  Medication Bin checked and emptied on discharge Yes  SW/care coordinator/charge RN aware of discharge: Yes

## 2023-11-23 NOTE — PROVIDER NOTIFICATION
MD Notification    Notified Person: MD    Notified Person Name: Jack Choi    Notification Date/Time: 11/22/2023 2122    Notification Interaction: Amcom    Purpose of Notification: 1704 RP: FYI Plt 39. 1 unit Plt, IVIG, solumedrol given today. No bleeding present.  Transfuse <25 w/bleed, <10 w/o per note.    Orders Received:    Comments:

## 2023-11-23 NOTE — PROGRESS NOTES
"Oncology/Hematology Follow Up Note:    Assessment and Plan:  #1 ITP  - Poor response to steroids  - Only transient response to IVIG for 1-2 weeks  - Started Rituxan in the outpatient setting, completed 1 of 4 weekly infusions.  Takes 1 1/2 mo to start working  - Positive TORREY.  Has not seen rheumatology  - Admitted with Plt of 4.  Given high dose steroids and IVIG 0.5 g/kg.  Plt up to 40 today    PLAN:  - Give another dose of IVIG at the same dosage today  - OK to give another 1 g of Solumedrol today  - Will have Dr. Vail's office call Link tomorrow for instructions on steroid taper  - Reasonable to continue Rituxan, due Monday 11/27.  - In my experience patients with underlying rheumatologic conditions have poor response to ITP treatments, until the underlying condition is treated with a steroid sparing immunosuppressive agent such as hydroxychloroquine.  Would recommend outpatient rheumatology evaluation.    Jonathan Smiley M.D.  [facility]  [phone]    Subjective:    Doing well.  No significant bleeding issues.      Labs:  All labs reviewed    CBC  Recent Labs   Lab 11/23/23  0601 11/22/23 2036 11/22/23  1234   WBC 9.4 6.4 6.3   HGB 13.7 13.9 15.5   MCV 89 88 90   PLT 40* 39* 4*       CMP  Recent Labs   Lab 11/22/23  1234      POTASSIUM 3.8   CHLORIDE 99   CO2 29   ANIONGAP 10   *   BUN 23.1*   CR 1.15   GFRESTIMATED 72   SCOTT 9.0   PROTTOTAL 7.7   ALBUMIN 4.1   BILITOTAL 0.4   ALKPHOS 49   AST 20   ALT 30       INR  Recent Labs   Lab 11/22/23  1234   INR 0.91       Blood CultureNo results for input(s): \"CULT\" in the last 168 hours.      Jonathan Smiley MD  [facility]  11/23/2023 11:34 AM        "

## 2023-11-23 NOTE — PLAN OF CARE
Goal Outcome Evaluation:    Summary: low platelets    DATE & TIME: 7132-4331    Cognitive Concerns/ Orientation: A&O x4   BEHAVIOR & AGGRESSION TOOL COLOR: Green  CIWA SCORE: NA   ABNL VS/O2: VSS on RA  MOBILITY: Independent  PAIN MANAGMENT: denies  DIET: Regular  BOWEL/BLADDER: Continent  ABNL LAB/BG: Platelets 4  DRAIN/DEVICES: L PIV SL, R PIV infusing IVIG (MD notified that new order needs to be placed for rate change)  TELEMETRY RHYTHM: NA  SKIN: WDL  TESTS/PROCEDURES: none new  D/C DAY/GOALS/PLACE: home pending improvement  OTHER IMPORTANT INFO: Platelets given in ED

## 2023-11-23 NOTE — DISCHARGE SUMMARY
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 Received IVIG, 1 unit of platelets, and solumedrol. Platelets improved to 40,000. Patient was asymptomatic. Had petechiae on palate but no obvious bleeding. Discussed with on-call hematologist Dr. Smiley on 11/23 who recommended additional dose of IVIG prior to discharge. Patient will continue on prednisone 100 mg daily until outpatient oncology team instructs him on taper plan. He has a follow up appointment in 4 days for next dose of Rituximab.    Patient may discharge home after dose of IVIG and methylprednisolone on 11/23.      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  In the last 3 weeks started IVIG, Solu-Medrol, and rituximab 2 days ago.  11/20 Platelets 15 (per pt) have fallen to 4, as proximal reason for admission from oncology office for platelet transfusion, steroids IVIG, additional work-up.  US abdomen w/o splenomegaly.  Transfuse 1 unit per oncology. Had minimal nosebleed (pinkish), o/w no other acute bleeding sx  -Transfuse 1 unit now, prn plts<25 w bleed, >10 w/o  -[facility] consulted  - s/p IVIG in ED  - s/p Methylpred in ED  -Ultrasound abdomen-spleen normal size  - Platelet count improved to 40,000  "after 1 unit platelet and IVIG. Repeated IVIG on day of discharge. Patient will continue on PTA prednisone 100 mg daily until his oncology team calls him for a steroid taper plan.      Mild epistaxis, resolved  Pinkish bleed before, Likely secondary to thrombocytopenia.  However if recurs give Afrin as first as needed to abort, direct pressure. Resolved on discharge      Elevated BP w/o hx HTN  Likely steroids related. /84 on discharge      Fatty liver  Hx Alcohol use  Per patient/wife report about 2 olman per night.  Could contribute to low plts and hx fatty liver.  Advised to stop all alcohol , c.w past advised  -Alcohol cessation encouraged     Hx Gout - Hold indomethacin until follow up with your oncologist. Could be contributing to lo platelets      Clinically Significant Risk Factors Present on Admission                # Thrombocytopenia: Lowest platelets = 4 in last 2 days, will monitor for bleeding        # Obesity: Estimated body mass index is 31.93 kg/m  as calculated from the following:    Height as of this encounter: 1.727 m (5' 8\").    Weight as of this encounter: 95.3 kg (210 lb).              TOTAL DISCHARGE TIME:  IAngelito DO, personally saw the patient today and spent greater than 30 minutes discharging this patient.    Angelito Lehman DO  [facility]  Securely message with the Vocera Web Console (learn more here)  Text page via [facility] Paging/Directory    DISCHARGE MEDICATIONS:       Review of your medicines        CONTINUE these medicines which have NOT CHANGED        Dose / Directions   omeprazole 20 MG DR capsule  Commonly known as: PriLOSEC      Dose: 20 mg  Take 20 mg by mouth daily  Refills: 0     predniSONE 20 MG tablet  Commonly known as: DELTASONE      Dose: 100 mg  Take 100 mg by mouth daily Take with food  Refills: 0            STOP taking      indomethacin 50 MG capsule  Commonly known as: INDOCIN                ALLERGIES:    Allergies " "  Allergen Reactions    Penicillins Rash       DISPOSITION:    Discharged to home  Condition at discharge: Stable        Reason for your hospital stay    You were admitted to the hospital from clinic for low platelets. Your platelet count improved after platelet transfusion and IVIG     Activity    Your activity upon discharge: activity as tolerated     Follow-up and recommended labs and tests     Continue prednisone 100 mg daily until follow up with your hematologist. Return to ED if you develop spontaneous bleeding, dizziness, lightheadedness, severe headache, focal weakness, or vision changes. Follow up on Monday for next dose of Rituximab as planned. On call hematologist discussed possible rheumatology referral to consider non-steroid agent for ITP. Hold indomethacin until follow up with hematologist. Indomethacin could be contributing to low platelets.     Diet    Follow this diet upon discharge: Orders Placed This Encounter      Combination Diet Regular Diet Adult        Consultations This Hospital Stay   HEMATOLOGY & ONCOLOGY IP CONSULT     LABORATORY IMAGING AND PROCEDURES:   Laboratory studies that included CBC with platelets differential, BMP. Pertinent findings discussed above.      US abdomen 11/22/23   Normal size spleen     PENDING RESULTS:    Unresulted Labs Ordered in the Past 30 Days of this Admission       Date and Time Order Name Status Description    11/22/2023  8:34 PM Bld morphology pathology review In process         These results will be followed up by IM group and oncology    PHYSICAL EXAMINATION ON DAY OF DISCHARGE:       /84 (BP Location: Left arm)   Pulse 56   Temp 97.7  F (36.5  C) (Oral)   Resp 16   Ht 1.727 m (5' 8\")   Wt 95.3 kg (210 lb)   SpO2 96%   BMI 31.93 kg/m      Constitutional: Awake, alert, cooperative, no apparent distress.    ENT: Normocephalic, without obvious abnormality, atraumatic, oral pharynx with moist mucus membranes, tonsils without erythema. Petechiae " visible on oral mucosa, no active bleeding  Neck: Supple, symmetrical, trachea midline, no adenopathy.  Pulmonary: No increased work of breathing, good air exchange, clear to auscultation bilaterally, no crackles or wheezing.  Cardiovascular: Regular rate and rhythm, normal S1 and S2, no S3 or S4, and no murmur noted.  GI: Normal bowel sounds, soft, non-distended, non-tender.  Obese.  Skin/Integumen: Visualized skin appeared clear.  Neuro: CN II-XII grossly intact.  Patient seen moving all 4 extremities without difficulty.    Psych:  Alert and oriented x 3. Normal affect.  Extremities: No lower extremity edema noted, and calves are non-tender to palpation bilaterally. Dorsal pedal pulses and posterior tibial pulses palpable.      Angelito Lehman, DO

## 2023-11-23 NOTE — PLAN OF CARE
Goal Outcome Evaluation:     Summary: low platelets     DATE & TIME: 11/23/2023 8926-1148                 Cognitive Concerns/ Orientation: A&O x4   BEHAVIOR & AGGRESSION TOOL COLOR: Green  CIWA SCORE: NA        ABNL VS/O2: VSS on RA  MOBILITY: Independent  PAIN MANAGMENT: denies  DIET: Regular  BOWEL/BLADDER: Continent  ABNL LAB/BG: Platelets 4, 39.    DRAIN/DEVICES: L PIV SL, R PIV SL  TELEMETRY RHYTHM: NA  SKIN: WDL  TESTS/PROCEDURES: none new  D/C DAY/GOALS/PLACE: home pending improvement  OTHER IMPORTANT INFO: Platelets, IVIG, solumedrol given in ED.  MD paged FYI for Platelet critical result of 39.  Hospitalist note states to transfuse <25 with evidence of bleeding, <10 without.

## 2023-11-23 NOTE — PROGRESS NOTES
Admission    Patient arrives to room 605 via cart from ED.  Care plan note: alert and oriented x4. VSS on RA. Independent in room. Continent of bowel and bladder. Scattered bruises. Double skin check completed with TE.    Inpatient nursing criteria listed below were met:    Did you put disposition on whiteboard and in sticky note: Yes  Full skin assessment done (add LDA if skin issue present). Initials of 2nd RN :Yes, TE  Isolation education started/completed NA  Patient allergies verified with patient: Yes  Fall Risk? (Care plan updated, Education given and documented) Yes  Primary Care Plan initiated: Yes  Home medications documented in belongings flowsheet: NA  Patient belongings documented in belongings flowsheet: Yes  Reminder note (belongings/ medications) placed in discharge instructions:Yes  Admission profile/ required documentation complete: Yes  If patient is a 72 hour hold/Commitment are belongings removed from room and locked up? NA

## 2023-11-23 NOTE — PLAN OF CARE
Goal Outcome Evaluation:       Summary: low platelets  DATE & TIME: 11/22/2023 6583-6769                 Cognitive Concerns/ Orientation: A&O x4   BEHAVIOR & AGGRESSION TOOL COLOR: Green  CIWA SCORE: NA        ABNL VS/O2: VSS on RA  MOBILITY: Independent  PAIN MANAGMENT: denies  DIET: Regular  BOWEL/BLADDER: Continent  ABNL LAB/BG: Platelets 39.    DRAIN/DEVICES: L PIV SL, R PIV SL  TELEMETRY RHYTHM: NA  SKIN: WDL  TESTS/PROCEDURES: none this shift  D/C DAY/GOALS/PLACE: home pending improvement  OTHER IMPORTANT INFO: Platelets, IVIG, and solumedrol given in ED.  Hospitalist note states to transfuse <25 with evidence of bleeding, <10 without bleeding.

## 2023-11-23 NOTE — PROVIDER NOTIFICATION
MD Notification    Notified Person: MD    Notified Person Name: Dr. Smiley    Notification Date/Time: 11/23 0830    Notification Interaction: Paged on call Oncology    Purpose of Notification: Platelets 40, need platelet transfusion order    Orders Received:    Comments:

## 2023-11-24 LAB
PATH REPORT.COMMENTS IMP SPEC: NORMAL
PATH REPORT.COMMENTS IMP SPEC: NORMAL
PATH REPORT.FINAL DX SPEC: NORMAL
PATH REPORT.MICROSCOPIC SPEC OTHER STN: NORMAL
PATH REPORT.MICROSCOPIC SPEC OTHER STN: NORMAL
PATH REPORT.RELEVANT HX SPEC: NORMAL

## 2024-01-23 ENCOUNTER — TRANSFERRED RECORDS (OUTPATIENT)
Dept: HEALTH INFORMATION MANAGEMENT | Facility: CLINIC | Age: 64
End: 2024-01-23
Payer: COMMERCIAL

## 2024-01-23 ENCOUNTER — MEDICAL CORRESPONDENCE (OUTPATIENT)
Dept: HEALTH INFORMATION MANAGEMENT | Facility: CLINIC | Age: 64
End: 2024-01-23
Payer: COMMERCIAL

## 2024-01-23 LAB
ALT SERPL-CCNC: 25 U/L (ref 7–40)
AST SERPL-CCNC: 22 U/L (ref 13–40)
CREATININE (EXTERNAL): 1.05 MG/DL (ref 0.5–1.2)
GFR ESTIMATED (EXTERNAL): 80 ML/MIN/1.73M2
GLUCOSE (EXTERNAL): 94 MG/DL (ref 73–126)
POTASSIUM (EXTERNAL): 4.7 MMOL/L (ref 3.5–5.1)

## 2024-01-24 ENCOUNTER — TRANSCRIBE ORDERS (OUTPATIENT)
Dept: OTHER | Age: 64
End: 2024-01-24

## 2024-01-24 ENCOUNTER — TRANSFERRED RECORDS (OUTPATIENT)
Dept: HEALTH INFORMATION MANAGEMENT | Facility: CLINIC | Age: 64
End: 2024-01-24
Payer: COMMERCIAL

## 2024-01-24 DIAGNOSIS — D69.3 CHRONIC ITP (IDIOPATHIC THROMBOCYTOPENIC PURPURA) (H): Primary | ICD-10-CM

## 2024-01-25 ENCOUNTER — TELEPHONE (OUTPATIENT)
Dept: HEMATOLOGY | Facility: CLINIC | Age: 64
End: 2024-01-25
Payer: COMMERCIAL

## 2024-01-25 NOTE — CONFIDENTIAL NOTE
[id number]  Link Duran [facility]  63 year old male  CBCD Diagnosis: ITP  CBCD Provider: Braxton    RN called to discuss emergent referral with Link. Dr. Limon is able to add him on 1/31. Patient declines this appointment as he is going to be out of town. Dr. Limon not in clinic the following week but able to add on 2/14. Patient fine with this and has an appointment scheduled.     Huma Michaels  MSN, RN, PHN  [facility]  Office: [phone]  Fax: [phone]

## 2024-02-13 NOTE — PROGRESS NOTES
[facility] Hematology Consultation    Outpatient Visit Note:    Patient: Link Campbell  MRN: [id number]  : 1960  MONICA: 2024    Reason for Consultation: chronic ITP.    Assessment:  Link Campbell is a 63 year old male with pmhx significant for gout, fatty liver disease, excess EtOH use, and chronic ITP, which has been refractory to steroids/rituximab, with a good response to TPO-RA but could not tolerate toxicity, now on alternative TPO-RA but too early to  response and has mild drug rash.  Could consider either continuing therapy to assess response given mild rash vs change to Promacta.      # Chronic ITP  2022 - Established with Dr. Vail.  HIV, HBV, HCV negative. SPEP and TSH normal.  Positive TORREY.   Beginning 2023, has had platelets <30k on multiple occasions.  Has been trialed on high-dose steroids, and completed weekly Rituximab x4 (November-2023). Has remained non-responsive to Rituximab.   BMBx on 2023: normocellular marrow for age with trilineage hematopoiesis including mildly increased, normal-appearing megakaryocytes. No evidence of B or T-cell lymphoproliferative disorder. Adequate storage iron and sideroblastic iron. Cytogenetics normal.  Started Nplate 2023, but developed rash.  Trialed avatrombopag, starting 2024. Unfortunately, now developed rash as of .    Of note, patient has historically had a positive TORREY, and there is question of possible underlying rheumatologic condition. He has appt scheduled with rheumatology on .  We agree with rheumatology evaluation.     Given that patient has had good response with TPO-Jerry, we would favor trying to utilize TPO-RA, either attempting to desensitize to rash vs trialing alternate specific agents, to see if any are better tolerated than others.     Recommendations:  - At this time, we unfortunately do not have any active clinical trials for ITP  - Recommend  continuation of avatrombopag, monitoring for potential resolution of rash over time.  If no improvement or worsening of rash, would recommend trialing alternate TPO-RA, such as Promacta.    - If multiple different TPO-Jerry cannot be tolerated due to side effects, would recommend trialing fostamatinib (though would need to monitor LFTs, particularly given hx of fatty liver disease)  - Alternatively, we discussed consideratio of splenectomy, given patient is relatively young and overall healthy without significant comorbidities.  We discussed durable response rate of approx 60-70% with splenectomy, and even in those without durable response/CR, disease course is often milder/more responsive to next treatment options.  - Could also consider other immunosuppressive/immunomodulatory agents, such as danazol + mycophenolate or azathioprine vs cyclophosphamide or cyclosporine.  However, these regimens carry higher side-effect profiles, so would defer for now.  - Agree with keeping rheumatology appointment 2/29/2024, to evaluate for potential underlying process contributing to/causing secondary ITP.         The patient is given our center's contact information and is instructed to call if he should have any further questions or concerns.      Patient was seen and staffed with Dr. Limon.      Eusebia Espinal MD  Hematology/Oncology Fellow, PGY-4    Physician Attestation   I saw this patient with the resident and agree with the resident s findings and plan of care as documented in the resident s note.      Briefly, 63 year old man with chronic persistent ITP, refractory to steroids/rituximab.  Currently assessing response to second TPO-RA after first TPO-RA was effective but not tolerable.  Could consider changing to eltrombopag, fostamatinib or splenectomy next.  We discussed all these risks and benefits.      Earl Limon MD   of Medicine  [employer]     90 minutes spent  by me on the date of the encounter doing chart review, history and exam, documentation and further activities per the note  ----------------------------------------------------------------------------------------------------------------------    History of Present Illness:  Link Campbell is a 63 year old male with pmhx significant for gout, fatty liver disease, excess EtOH use, and chronic ITP, who is referred to hematology for second opinion regarding management of chronic ITP, which has been refractory to steroids/rituximab.    Patient has been followed by Dr. Vail with [facility].  His ITP hx dates back to at least 2015, at which time Plts were reportedly noted to be in the range of 90,000.  Per outside notes, he had a gradual decline in platelet counts over the following years, trending down to 35,000 in 2020 and 40,000 in 2022.  He was subsequently referred to hematology, where he established with Dr. Vail in February 2022.  At that time, Plts were 45k, Hgb 15.3, and WBC 6.9 with normal differential. See below for summary of history (extracted/copied from outside records):    -2[place]10/20[place]: [place] [facility] [place]. WBC 6.9, hemoglobin 15.3, platelets 45, no clumping noted.  MCV 19.  Normal differential count.  INR 1.1.  CMP normal.  TSH normal 3.12.  SPEP-no paraprotein detected.  Negative for hepatitis B, hepatitis C, HIV.  LDH normal 208.  TORREY positive 1:160, homogeneous H. pylori in the stool negative.  Peripheral smear-mild thrombocytopenia, otherwise negative.  2/17/2022: US abdomen-no acute findings.  Diffuse fatty infiltration of the liver.  Normal size liver and spleen.  3/23/2023: WBC 7.6, hemoglobin 14.9, platelets 47,000, ANC 4.2  9/25/2023: LDH and CMP normal, WBC 6.0, hemoglobin 14.5, platelets 29,000, ANC 3.1  10/2/2023: WBC 6.1, hemoglobin 14.8, platelets 37,000, ANC 3.2, INR 1.4  10/27/2023: Dexamethasone 40 mg daily for 4 days for persistently low platelet count and 30,000  range.  Platelets 22,000.  11/6/23: F/u in clinic- plts 9 K- given 1000 mg iv solumedrol, started on prednisone 1 mg/kg   11/8/2023: Solu-Medrol 1000 g given IV  11/8, 11/9, 11/10/2023: IVIG given.  11/10/2023: Prednisone 100 mg daily started.  Platelets 67,000  11/13/2023: Prednisone decreased to 90 mg's.  Platelets 113,000  11/15/2023: Prednisone increased to 100 mg's, platelets 85,000  11/20/2023: Started rituximab weekly x4  11/20/2023: Stool positive for H. Pylori  11/22/2023-11/23/2023: Platelets 3,000. Hospitalized and received IVIG x2 doses, 1 unit of platelets, and Solu-Medrol.  US spleen within normal limits.  Started on prednisone 100 mg daily.   11/27/2023: Decreased/weaned off prednisone 80 mg daily, as this had been ineffective.  Started on triple therapy for H. Pylori  11/30/2023: Platelets 35,000.  Decreased prednisone to 60 mg daily  12/1/2023: Plts 39,000. Given dose of IVIG  12/7/2023: Plts 93,000. Prednisone decreased to 40 mg daily  12/11/2023: Plts 52,000. Prednisone decreased to 30 mg daily  12/14/2023: CT C/A/P negative for neoplastic or inflammatory process. Several noncalcified pulmonary nodules. Mild to moderate prostate enlargement. Colonic diverticulosis.  Plts 39,000.  Prednisone decreased to 20 mg daily.  12/18/2023: Platelets 10,000. Decreased prednisone to 10 mgs daily.  Given IVIG x2 doses (12/18 and 12/19)  12/20/2023: BMBx: normocellular marrow for age with trilineage hematopoiesis including mildly increased, normal-appearing megakaryocytes. No evidence of B or T-cell lymphoproliferative disorder. Adequate storage iron and sideroblastic iron. Cytogenetics normal.  12/22/2023: Started Nplate  12/29/20203: Platelets 145,000. Prednisone stopped.  1/5/2024: Platelets >200,000 for 2 weeks. Nplate held (it was at lowest dose). Pt noted to have significant rash. Medrol dosepak prescribed.  1/12/2024: H pylori stool antigen test negative  1/19/2024: Plts dropped to 48,000. Received Nplate  1 mcg/kg.  Again developed rash after Nplate.   1/23/2024: Given 1000 mg IV SoluMedrol.  Started prednisone taper, starting at 60 mg daily w/ PPI.   - Referred to N for second opinion and consideration of possible trial for refractory ITP.       Patient reports the following interim history:  1/25/2024: Plts down to 13,000. Received IVIG x2 on 1/25 and 1/26 1/26/2024: Plts 52  Pt was on vacation from 1/27-2/5, but went in to a clinic on 1/30 for a platelet check. Plts on 1/30 were 114.  2/7/2024: Plts were 22. Received IVIG x1 on 2/7 2/9/2024: Started on Doptelet. Plts were 86k  2/13/2024: Plts were 108k      He has been continuing with prednisone taper since starting on 1/23, and was down to 10 mg prednisone daily, as of 2/13/2024.  However, he began to develop a rash on 2/13.  He describes the rash as pruritic, and involves his entire body, including palms of his hands, but sparing his face at this time. Denies eye or mouth involvement/sores. Denies dysphagia.  This rash is different compared to the rash from Nplate. States that that rash from there Nplate started out as tiny dots that developed into blotchy areas, but was much more scattered/did not involve his entire body.  His prednisone was increased to 30 mg daily today, due to the rash.    With regard to his ITP, he reports that he has never had any noticeable symptoms.  Has never previously experienced rash, gum/mouth bleeding, hematuria, melena, hematochezia or hemoptysis.  He will have occasional epistaxis when platelets are low, but these self resolve and have never required ED visits.  Denies any hx of blood clots. Denies any GI bleeding, or other bleeding concerns.     Patient is active, on his feet frequently and moving about. Additionally walks a couple of miles several days per week.  Denies fevers, sweats/chills, SOB, chest pain, cough, URI symptoms, dysphagia, abdominal pain, nausea/vomiting, diarrhea, constipation, bone pain, headaches,  light-headedness, paresthesias.  Reports that he has lost approx 15 lbs over the past 3 months, but also notes that he quit drinking any EtOH during that time.  ColoGuard was negative in 2023. CT C/A/P in December negative for evidence of malignancy.        Past Medical History:  - Chronic ITP  - Gout  - Fatty liver disease    Past Surgical History:  No past surgical history on file.    Medications:  Current Outpatient Medications   Medication Sig Dispense Refill    DOPTELET tablet       predniSONE (DELTASONE) 20 MG tablet Take 100 mg by mouth daily Take with food      omeprazole (PRILOSEC) 20 MG DR capsule Take 20 mg by mouth daily (Patient not taking: Reported on 2024)     Calcium + B12 and Vit D supplements.     Allergies:  Allergies   Allergen Reactions    Penicillins Rash       ROS:  A 14 point ROS is negative except as stated in the HPI    Social History:  Lifelong non-smoker.  Denies any illicit drug use. Drinks EtOH - approx 1-2 glass of wine with dinner, most days.     Family History:  No known family hx of bleeding or clotting disorders.  Father - lung cancer (prior smoker, dx approx age 56)  Mother - colon cancer, esophageal cancer (heavy smoker);  from stroke (age 83)  Sister - MI (age 58; obese and heavy smoker)    Objective:  N/A as this was a phone call visit (due to technical issues with video visit)    Labs:  WBC Count   Date Value Ref Range Status   2023 9.4 4.0 - 11.0 10e3/uL Final   2023 6.4 4.0 - 11.0 10e3/uL Final   2023 6.3 4.0 - 11.0 10e3/uL Final     Hemoglobin   Date Value Ref Range Status   2023 13.7 13.3 - 17.7 g/dL Final   2023 13.9 13.3 - 17.7 g/dL Final   2023 15.5 13.3 - 17.7 g/dL Final     Hematocrit   Date Value Ref Range Status   2023 40.8 40.0 - 53.0 % Final   2023 40.8 40.0 - 53.0 % Final   2023 46.5 40.0 - 53.0 % Final     Platelet Count   Date Value Ref Range Status   2023 40 (LL) 150 - 450 10e3/uL  Final   11/22/2023 39 (LL) 150 - 450 10e3/uL Final   11/22/2023 4 (LL) 150 - 450 10e3/uL Final       Imaging:  US Abdomen Limited  Narrative: US ABDOMEN LIMITED 11/22/2023 2:03 PM    CLINICAL HISTORY: Eval for splenomegaly per oncology request for  thrombocytopenia  TECHNIQUE: Limited abdominal ultrasound.    COMPARISON: None.    FINDINGS: The spleen measures 12.1 cm in length. Normal appearance of  the spleen.  Impression: IMPRESSION:  1.  Normal spleen measuring 12.1 cm in length.    YADI IHLL MD         SYSTEM ID:  [id number]      Phone-Visit Details:  Type of service:  Phone Visit  Phone Start Time:  1538  Phone End Time (time video stopped): 1630  Originating Location (pt. Location): Home  Distant Location (provider location):  [facility]  Mode of Communication: Phone call

## 2024-02-14 ENCOUNTER — VIRTUAL VISIT (OUTPATIENT)
Dept: HEMATOLOGY | Facility: CLINIC | Age: 64
End: 2024-02-14
Attending: INTERNAL MEDICINE
Payer: COMMERCIAL

## 2024-02-14 DIAGNOSIS — D69.3 IDIOPATHIC THROMBOCYTOPENIC PURPURA (H): Primary | ICD-10-CM

## 2024-02-14 PROCEDURE — 99205 OFFICE O/P NEW HI 60 MIN: CPT | Mod: 93 | Performed by: INTERNAL MEDICINE

## 2024-02-14 PROCEDURE — 99417 PROLNG OP E/M EACH 15 MIN: CPT | Performed by: INTERNAL MEDICINE

## 2024-02-14 RX ORDER — AVATROMBOPAG MALEATE 20 MG/1
TABLET, FILM COATED ORAL
COMMUNITY
Start: 2024-02-07

## 2024-02-14 NOTE — PROGRESS NOTES
Patient was contacted to complete the pre-visit call prior to their video visit with the provider.      Allergies and medications were reviewed.    I thanked them for their time to cover this information   Moses Galvan CMA